# Patient Record
Sex: MALE | Race: WHITE | Employment: FULL TIME | URBAN - METROPOLITAN AREA
[De-identification: names, ages, dates, MRNs, and addresses within clinical notes are randomized per-mention and may not be internally consistent; named-entity substitution may affect disease eponyms.]

---

## 2018-07-20 ENCOUNTER — OFFICE VISIT (OUTPATIENT)
Dept: PRIMARY CARE CLINIC | Age: 43
End: 2018-07-20

## 2018-07-20 VITALS
BODY MASS INDEX: 23.21 KG/M2 | RESPIRATION RATE: 18 BRPM | OXYGEN SATURATION: 97 % | SYSTOLIC BLOOD PRESSURE: 136 MMHG | HEART RATE: 71 BPM | HEIGHT: 71 IN | DIASTOLIC BLOOD PRESSURE: 82 MMHG | WEIGHT: 165.8 LBS

## 2018-07-20 DIAGNOSIS — F41.1 ANXIETY AS ACUTE REACTION TO EXCEPTIONAL STRESS: ICD-10-CM

## 2018-07-20 DIAGNOSIS — K58.0 IRRITABLE BOWEL SYNDROME WITH DIARRHEA: Primary | ICD-10-CM

## 2018-07-20 DIAGNOSIS — F43.0 ANXIETY AS ACUTE REACTION TO EXCEPTIONAL STRESS: ICD-10-CM

## 2018-07-20 LAB — TSH REFLEX FT4: 1.45 UIU/ML (ref 0.27–4.2)

## 2018-07-20 PROCEDURE — 99203 OFFICE O/P NEW LOW 30 MIN: CPT | Performed by: INTERNAL MEDICINE

## 2018-07-20 RX ORDER — DICYCLOMINE HYDROCHLORIDE 10 MG/1
10 CAPSULE ORAL 4 TIMES DAILY PRN
Qty: 30 CAPSULE | Refills: 3 | Status: SHIPPED | OUTPATIENT
Start: 2018-07-20 | End: 2018-08-19

## 2018-07-20 RX ORDER — ONDANSETRON 4 MG/1
4 TABLET, FILM COATED ORAL DAILY PRN
Qty: 60 TABLET | Refills: 3 | Status: SHIPPED | OUTPATIENT
Start: 2018-07-20

## 2018-07-20 RX ORDER — PAROXETINE 10 MG/1
10 TABLET, FILM COATED ORAL EVERY EVENING
Qty: 30 TABLET | Refills: 3 | Status: SHIPPED | OUTPATIENT
Start: 2018-07-20

## 2018-07-20 ASSESSMENT — ENCOUNTER SYMPTOMS
EYE REDNESS: 0
VOMITING: 1
RESPIRATORY NEGATIVE: 1
NAUSEA: 1
PHOTOPHOBIA: 0
EYE ITCHING: 0
EYE PAIN: 0
ABDOMINAL PAIN: 1
EYE DISCHARGE: 0

## 2018-07-20 ASSESSMENT — PATIENT HEALTH QUESTIONNAIRE - PHQ9
SUM OF ALL RESPONSES TO PHQ9 QUESTIONS 1 & 2: 0
SUM OF ALL RESPONSES TO PHQ QUESTIONS 1-9: 0
2. FEELING DOWN, DEPRESSED OR HOPELESS: 0
1. LITTLE INTEREST OR PLEASURE IN DOING THINGS: 0

## 2018-07-20 NOTE — PATIENT INSTRUCTIONS
Use the medication as directed until the acute stress situation is resolved and then try coming off of all the medications to see if you are OK. Lab review.  See me in one month

## 2018-07-20 NOTE — PROGRESS NOTES
Sloane Cota  YOB: 1975    Date of Service:  7/20/2018    Chief Complaint   Patient presents with   [facility] Establish Care    Anxiety    GI Problem    Weight Loss     Here to establish care after acute stress, diarrhea and weight loss began a week and a half ago. He has regained 5 pounds of the lost weight but is still nauseated with frequent diarrhea. Also quite anxious about recent business deal that soured. GI Problem   The primary symptoms include weight loss, abdominal pain, nausea and vomiting. Primary symptoms do not include fever or fatigue. The onset was gradual.   The weight loss began 2 weeks ago. Nausea began more than 1 week ago. The nausea is associated with emotional stress. The nausea is exacerbated by stress. Vomiting occurred once. The illness does not include chills. Significant associated medical issues include irritable bowel syndrome. No Known Allergies  Outpatient Prescriptions Marked as Taking for the 7/20/18 encounter (Office Visit) with Keshawn Reese MD   Medication Sig Dispense Refill    PARoxetine (PAXIL) 10 MG tablet Take 1 tablet by mouth every evening 30 tablet 3    dicyclomine (BENTYL) 10 MG capsule Take 1 capsule by mouth 4 times daily as needed (diarrhea) 30 capsule 3    ondansetron (ZOFRAN) 4 MG tablet Take 1 tablet by mouth daily as needed for Nausea 60 tablet 3         There is no immunization history on file for this patient. Past Medical History:   Diagnosis Date    Cervical pain      No past surgical history on file. No family history on file. Review of Systems:  Review of Systems   Constitutional: Positive for weight loss. Negative for activity change, appetite change, chills, diaphoresis, fatigue, fever and unexpected weight change. HENT: Negative. Eyes: Negative for photophobia, pain, discharge, redness, itching and visual disturbance. Respiratory: Negative. Cardiovascular: Negative.     Gastrointestinal: Positive for abdominal pain, nausea and vomiting. Genitourinary: Negative. Musculoskeletal: Negative. Skin: Negative. Neurological: Negative. Psychiatric/Behavioral: Negative. Vitals:    07/20/18 1149   BP: 136/82   Site: Left Arm   Position: Sitting   Cuff Size: Medium Adult   Pulse: 71   Resp: 18   SpO2: 97%   Weight: 165 lb 12.8 oz (75.2 kg)   Height: 5' 11\" (1.803 m)     Body mass index is 23.12 kg/m². Wt Readings from Last 3 Encounters:   07/20/18 165 lb 12.8 oz (75.2 kg)   07/16/18 162 lb 4.1 oz (73.6 kg)     BP Readings from Last 3 Encounters:   07/20/18 136/82   07/17/18 (!) 135/94         Physical Exam   Constitutional: He is oriented to person, place, and time. He appears well-developed and well-nourished. HENT:   Head: Normocephalic and atraumatic. Eyes: Conjunctivae and EOM are normal. Pupils are equal, round, and reactive to light. Neck: Normal range of motion. Neck supple. Cardiovascular: Normal rate, regular rhythm and normal heart sounds. Pulmonary/Chest: Effort normal and breath sounds normal.   Abdominal: Bowel sounds are normal. He exhibits no distension and no mass. There is no tenderness. There is no rebound and no guarding. Musculoskeletal: Normal range of motion. He exhibits no edema, tenderness or deformity. Neurological: He is alert and oriented to person, place, and time. Skin: Skin is warm and dry. Psychiatric: He has a normal mood and affect.  His behavior is normal. Judgment and thought content normal.       Lab Review   Admission on 07/16/2018, Discharged on 07/17/2018   Component Date Value    WBC 07/16/2018 9.2     RBC 07/16/2018 5.23     Hemoglobin 07/16/2018 16.3     Hematocrit 07/16/2018 47.1     MCV 07/16/2018 90.1     MCH 07/16/2018 31.2     MCHC 07/16/2018 34.6     RDW 07/16/2018 13.4     Platelets 47/70/9608 218     MPV 07/16/2018 8.9     Neutrophils % 07/16/2018 57.1     Lymphocytes % 07/16/2018 35.2     Monocytes %

## 2018-07-20 NOTE — ASSESSMENT & PLAN NOTE
Recent business deal went [place] and the patient has been having stomach upset , diarrhea and anxiety for the exact same length of time.